# Patient Record
Sex: FEMALE | Race: WHITE | Employment: PART TIME | URBAN - METROPOLITAN AREA
[De-identification: names, ages, dates, MRNs, and addresses within clinical notes are randomized per-mention and may not be internally consistent; named-entity substitution may affect disease eponyms.]

---

## 2020-10-27 ENCOUNTER — OFFICE VISIT (OUTPATIENT)
Dept: FAMILY MEDICINE CLINIC | Facility: CLINIC | Age: 25
End: 2020-10-27
Payer: COMMERCIAL

## 2020-10-27 VITALS
DIASTOLIC BLOOD PRESSURE: 70 MMHG | TEMPERATURE: 100 F | HEART RATE: 107 BPM | OXYGEN SATURATION: 97 % | RESPIRATION RATE: 18 BRPM | BODY MASS INDEX: 34.31 KG/M2 | HEIGHT: 68.5 IN | SYSTOLIC BLOOD PRESSURE: 120 MMHG | WEIGHT: 229 LBS

## 2020-10-27 DIAGNOSIS — Z20.822 SUSPECTED COVID-19 VIRUS INFECTION: ICD-10-CM

## 2020-10-27 DIAGNOSIS — J01.10 ACUTE NON-RECURRENT FRONTAL SINUSITIS: Primary | ICD-10-CM

## 2020-10-27 PROCEDURE — 3078F DIAST BP <80 MM HG: CPT | Performed by: NURSE PRACTITIONER

## 2020-10-27 PROCEDURE — 3008F BODY MASS INDEX DOCD: CPT | Performed by: NURSE PRACTITIONER

## 2020-10-27 PROCEDURE — 3074F SYST BP LT 130 MM HG: CPT | Performed by: NURSE PRACTITIONER

## 2020-10-27 PROCEDURE — U0003 INFECTIOUS AGENT DETECTION BY NUCLEIC ACID (DNA OR RNA); SEVERE ACUTE RESPIRATORY SYNDROME CORONAVIRUS 2 (SARS-COV-2) (CORONAVIRUS DISEASE [COVID-19]), AMPLIFIED PROBE TECHNIQUE, MAKING USE OF HIGH THROUGHPUT TECHNOLOGIES AS DESCRIBED BY CMS-2020-01-R: HCPCS | Performed by: NURSE PRACTITIONER

## 2020-10-27 PROCEDURE — 99213 OFFICE O/P EST LOW 20 MIN: CPT | Performed by: NURSE PRACTITIONER

## 2020-10-27 RX ORDER — AMOXICILLIN AND CLAVULANATE POTASSIUM 875; 125 MG/1; MG/1
1 TABLET, FILM COATED ORAL 2 TIMES DAILY
Qty: 20 TABLET | Refills: 0 | Status: SHIPPED | OUTPATIENT
Start: 2020-10-27 | End: 2020-11-06

## 2020-10-27 NOTE — PATIENT INSTRUCTIONS
Sinusitis (Antibiotic Treatment)    The sinuses are air-filled spaces within the bones of the face. They connect to the inside of the nose. Sinusitis is an inflammation of the tissue that lines the sinuses. Sinusitis can occur during a cold.  It can also · You can use an OTC decongestant, unless a similar medicine was prescribed to you. Nasal sprays work the fastest. Use one that contains phenylephrine or oxymetazoline. First blow your nose gently. Then use the spray.  Don't use these medicines more often t Call 911 if any of these occur:   · Seizure  · Trouble breathing  · Feeling dizzy or faint  · Fingernails, skin or lips look blue, purple , or gray  Prevention  Here are steps you can take to help prevent an infection:   · Keep good hand washing habits.   · Public health officials are working to find the source. How the virus spreads is not yet fully understood, but it seems to spread and infect people fairly easily.  Some people who have been infected in an area may not be sure how or where they were infected As experts learn more about NLJVS-79, other complications are being reported that may be linked to COVID-19. Rarely, some children have developed severe complications called multisystem inflammatory syndrome in children (MIS-C).  MIS-C seems to be similar t · Antibody blood test.  Antibody tests are being looked at to find out if a person has previously been infected with the virus and may now have antibodies such as SARS AB IgG in their blood to give some immunity.  The accuracy and availability of antibody t · Oxygen. You may be given supplemental oxygen or ventilation with a breathing machine (ventilator). This is done so you get enough oxygen in your body. · Prone positioning.   Depending on how sick you are during your hospital stay, your healthcare team ma © 2712-7727 [employer] [address]. [address] [facility] [place], [facility] [place]. All rights reserved. This information is not intended as a substitute for professional medical care. Always follow your healthcare professional's instructions.

## 2020-10-27 NOTE — PROGRESS NOTES
CHIEF COMPLAINT:   Patient presents with:  Ear Pain: RT ear, sinus pressure, headache, fever 99.5,       HPI:   Deshaun Bailey is a 30year old female who presents for cold symptoms for  3  days.  Symptoms have progressed into sinus congestion and been wors NOSE: nostrils patent, pink nasal mucous, nasal mucosa reddened  THROAT: oral mucosa pink, moist. No visible dental caries. Posterior pharynx is non erythematous. NECK: supple, non-tender  LUNGS: Breathing is non labored.  Lungs clear to auscultation bilat · Drink plenty of water, hot tea, and other liquids as directed by the healthcare provider. This may help thin nasal mucus. It also may help your sinuses drain fluids. · Heat may help soothe painful areas of your face. Use a towel soaked in hot water.  Or, · Use acetaminophen or ibuprofen to control pain, unless another pain medicine was prescribed to you. If you have chronic liver or kidney disease or ever had a stomach ulcer, talk with your healthcare provider before using these medicines.  Never give aspir Coronavirus disease 2019 (COVID-19) is a respiratory illness. It's caused by a new (novel) coronavirus. There are many types of coronavirus. Coronaviruses are a very common cause of colds and bronchitis. They may sometimes cause lung infection (pneumonia). What are possible complications from KZDXD-11? In many cases, this virus can cause infection (pneumonia) in both lungs. In some cases, this can cause death. Certain people are at higher risk for complications.  This includes older adults and people with se Your healthcare provider will ask about your symptoms. He or she will ask where you live, and about your recent travel, and any contact with sick people.  If your healthcare provider thinks you may have COVID-19, he or she will consider whether to test you The most proven treatments right now are those to help your body while it fights the virus. This is known as supportive care. Supportive care may include:   · Getting rest.  This helps your body fight the illness. · Staying hydrated.   Drinking liquids is People who have had COVID-19 and are fully recovered may be asked by their healthcare team to consider donating plasma. This is called COVID-19 convalescent plasma donation.  Plasma from people fully recovered from COVID-19 may contain antibodies to help fi

## 2025-07-21 NOTE — ANESTHESIA POSTPROCEDURE EVALUATION
[facility]    Maday Petty Patient Status:  Inpatient   Age/Gender 30 year old female MRN [id number]   Location [facility] SURGERY Attending Mary Mendenhall MD   Hosp Day # 0 PCP Julita Tate DO       Anesthesia Post-op Note    LAPAROSCOPIC CHOLECYSTECTOMY WITH CHOLANGIOGRAM    Procedure Summary       Date: 07/21/25 Room / Location:  MAIN OR 06 / [facility] MAIN OR    Anesthesia Start: 1404 Anesthesia Stop: 1543    Procedure: LAPAROSCOPIC CHOLECYSTECTOMY WITH CHOLANGIOGRAM (Abdomen) Diagnosis:       Cholecystitis      (Cholecystitis [K81.9])    Surgeons: Mary Mendenhall MD Anesthesiologist: Vipin Moe MD    Anesthesia Type: general ASA Status: 2            Anesthesia Type: No value filed.    Vitals Value Taken Time   /66 07/21/25 15:46   Temp 98.7 07/21/25 15:46   Pulse 87 07/21/25 15:46   Resp 18 07/21/25 15:46   SpO2 99% 07/21/25 15:46           Patient Location: PACU    Anesthesia Type: general    Airway Patency: patent    Postop Pain Control: adequate    Mental Status: mildly sedated but able to meaningfully participate in the post-anesthesia evaluation    Nausea/Vomiting: none    Cardiopulmonary/Hydration status: stable euvolemic    Complications: no apparent anesthesia related complications    Postop vital signs: stable    Dental Exam: Unchanged from Preop    Patient to be discharged from PACU when criteria met.

## 2025-07-21 NOTE — OPERATIVE REPORT
OPERATIVE NOTE    Maday Petty Location: OR   [facility] [id number] MRN [id number]   Admission Date 7/21/2025 Operation Date 7/21/2025   Attending Physician Mary Mendenhall MD Operating Physician Mary Mendenhall MD     PREOPERATIVE DIAGNOSIS  Acute cholecystitis    POSTOPERATIVE DIAGNOSIS  Same    PROCEDURE PERFORMED  Laparoscopic cholecystectomy  Transversus abdominis plane block  Intra-operative cholangiogram    SURGEON  Mary Mendenhall MD    ASSISTANTS  Nathaniel Patel her assistance was necessary for the conduct of this case especially in the dissection around the hilum and gallbladder removal  RAMY Aviles    ANESTHESIA  General    INDICATIONS   30 year old female presented to the hospital with 3 day history of worsening abdominal pain. Work up demonstrated a leukocytosis and ultrasound imaging with gallstones, pericholecystic fluid and wall thickening. Exam was positive for RUQ tenderness.  Patient was diagnosed with acute cholecystitis.  Cholecystectomy was indicated.  On ultrasound imaging, patient had dilated common bile duct.  Intraoperative cholangiogram was indicated.  The procedure and all the risks were discussed with the patient and she consented.    FINDINGS  Soft gallbladder with some pericholecystic fluid consistent with acute cholecystitis, normal cholangiogram with no filling defects and good flow of contrast into the duodenum    FINDINGS/DESCRIPTION OF PROCEDURE  After informed consent, patient was brought to the operating room and placed in supine position with arms out. Bilateral SCDs were placed and pre-operative antibiotics administered. Anesthesia was induced without any complication. Patient was prepped and draped in the usual sterile fashion. Time out was performed confirming patient, procedure, and site.    The Veress needle was used to gain pneumoperitoneum. Using blade, a curvilinear supraumbilical incision was made. Veress needle was inserted just under the umbilical stalk  with audible clicks. On aspiration, there was no bowel contents.  Saline flowed easily confirming presence in the abdomen.Pneumoperitoneum was created with CO2. An 11mm port was inserted. Upon entrance, no injury was noted to omentum or bowel at site of entrance.  Three 5mm ports were placed under direct visualization; two on the right lateral aspect of the abdomen and the third subxiphoid, triangulating toward the location of the gallbladder. Transversus abdominis plane block was performed.    Upon inspection of the abdomen, gallbladder was soft but distended. Gallbladder was retracted above the liver.  Dissection was started at the infundibulum. Cystic duct and artery were identified, clearly seeing the critical view of safety.  Cystic artery was right behind close lymph node.  In trying to dissect the lymph node off for better visualization, cystic artery was ligated with electrocautery.  The cystic duct was then clipped and partially opened. Cholangiogram catheter was inserted into the cystic duct and clipped in place. Cholangiogram was done and showed normal CBD and cystic duct with good flow of contrast into the duodenum. Catheter was removed and the cystic duct clipped and divided. The gallbladder was then resected from the fossa and placed in endocatch bag.  Gallbladder did have some pericholecystic fluid consistent with acute cholecystitis.  The area where the artery appeared to be ligated near the hilum was clipped. The gallbladder fossa was irrigated until clear. Hemostasis achieved. Gallbladder was extracted from abdominal cavity and sent for pathology.  Using Chris Hutchinson, fascia at umbilicus was closed with o vicryl. Pneumoperitoneum was evacuated. Skin incisions were closed with 4-o monocryl. Incisions were washed and dressed with dermabond.     At the end of the case, all counts were correct. Patient tolerated procedure well. Patient was awakened and transferred to PACU in a hemodynamically stable  condition.     SPECIMENS REMOVED  Gallbladder    ESTIMATED BLOOD LOSS  5 ml    COMPLICATIONS  None    Mary Mendenhall MD

## 2025-07-21 NOTE — H&P
[facility]  Report of Consultation    Maday Petty Patient Status:  Inpatient    1995 MRN [id number]   Location [facility] 3NW-A Attending Mary Mendenhall MD   Hosp Day # 0 PCP Julita Tate DO       Chief Complaint:  Abdominal pain    History of Present Illness:  Maday Petty is a a(n) 30 year old female who presents to [facility] with worsening abdominal pain.     The patient reports right upper quadrant abdominal pain which began Friday.  She reports a history of similar episodes of discomfort where she induced vomiting to alleviate her discomfort.  She reports multiple episodes of nausea and vomiting between Friday and yesterday night.  In the middle of the night she was woken up due to her discomfort.  She denies associated fevers or chills.  She denies changes in her bowel movements.    Upon presentation to the ER, the patient was hemodynamically stable.  CBC reveals WBC 5.5 with no left shift, hemoglobin 15.1, platelets 239,000.  Slightly hypokalemic at 3.2 with no other electrolyte abnormalities.  , , alkaline phosphatase 166.  Total bilirubin 1.1.  Lipase slightly elevated at 60.    Abdominal ultrasound reveals gallbladder with stone and sludge.  The largest stone measures approximately 4 mm.  There is no evidence of pericholecystic fluid.  The common bile duct measures up to 10 mm.    Currently, the patient is resting in bed with family at bedside.  She reports feeling much better compared to yesterday.  She denies nausea or vomiting.    The patient has a significant past surgical history of abdominoplasty. She has a significant past medical history of ureteropelvic junction obstruction in her right kidney for which she underwent a procedure for as a child. She denies taking blood thinning medications.      History:  Past Medical History[1]  Past Surgical History[2]  Family History[3]   reports that she has never smoked. She has never used smokeless tobacco. She  reports current alcohol use. She reports that she does not use drugs.    Allergies:  Allergies[4]    Medications:  Prescriptions Prior to Admission[5]    Current Hospital Medications[6]    Review of Systems:  Review of Systems   Constitutional:  Negative for chills, diaphoresis, fatigue, fever and unexpected weight change.   HENT:  Negative for hearing loss, nosebleeds, sore throat and trouble swallowing.    Respiratory:  Negative for apnea, cough, shortness of breath and wheezing.    Cardiovascular:  Negative for chest pain, palpitations and leg swelling.   Gastrointestinal:  Positive for abdominal pain, nausea and vomiting. Negative for abdominal distention, anal bleeding, blood in stool, constipation and diarrhea.   Genitourinary:  Negative for difficulty urinating, dysuria, frequency and urgency.   Musculoskeletal:  Negative for arthralgias and myalgias.   Skin:  Negative for color change and rash.   Neurological:  Negative for tremors, syncope and weakness.   Hematological:  Negative for adenopathy. Does not bruise/bleed easily.   Psychiatric/Behavioral:  Negative for behavioral problems and sleep disturbance.         Physical Exam:  /56 (BP Location: Left arm)   Pulse 64   Temp 98.2 °F (36.8 °C) (Oral)   Resp 15   Ht 68\"   Wt 165 lb (74.8 kg)   LMP 07/06/2025 (Approximate)   SpO2 98%   BMI 25.09 kg/m²   Physical Exam  Constitutional:       Appearance: Normal appearance.   HENT:      Head: Normocephalic and atraumatic.   Eyes:      Extraocular Movements: Extraocular movements intact.      Pupils: Pupils are equal, round, and reactive to light.   Pulmonary:      Effort: Pulmonary effort is normal. No respiratory distress.   Abdominal:      General: Abdomen is flat. Bowel sounds are normal. There is no distension.      Palpations: Abdomen is soft. There is no mass.      Tenderness: There is abdominal tenderness in the right upper quadrant and epigastric area. There is no guarding or rebound.    Musculoskeletal:         General: Normal range of motion.      Cervical back: Normal range of motion.   Skin:     General: Skin is warm.      Coloration: Skin is not jaundiced or pale.      Findings: No erythema.   Neurological:      General: No focal deficit present.      Mental Status: She is alert and oriented to person, place, and time.            Laboratory Data:  Recent Labs   Lab 07/21/25  0253   RBC 5.11   HGB 15.1   HCT 43.8   MCV 85.7   MCH 29.5   MCHC 34.5   RDW 12.6   NEPRELIM 2.49   WBC 5.5   .0       Recent Labs   Lab 07/21/25  0253   *   BUN 9   CREATSERUM 0.75   CA 9.7   ALB 4.9*      K 3.2*      CO2 27.0   ALKPHO 166*   *   *   BILT 1.1   TP 7.2         No results for input(s): \"PTP\", \"INR\", \"PTT\" in the last 168 hours.      US GALLBLADDER (CPT=76705)  Result Date: 7/21/2025  CONCLUSION: The gallbladder is prominent in size with gallbladder sludge and stones. There is possible gallbladder wall thickening as well. Clinical correlation is recommended for cholecystitis. If there is persistent concern then consider HIDA scan. Additionally, MRCP or ERCP may be considered for common ductal dilatation noted above. Mild to moderate right hydronephrosis is present. There are no prior studies available for comparison. Correlation with the history is suggested. Follow-up could be performed with CT if clinically indicated. A preliminary report was provided by [facility]. Electronically Verified and Signed by Attending Radiologist: Isael Kincaid MD 7/21/2025 6:20 AM Workstation: [id number]        Impression:  Problem List[7]    Biliary colic  Cholelithiasis    The patient was seen and examined with Dr. Mendenhall who recommends proceeding to the OR today for laparoscopic cholecystectomy with x-ray cholangiogram, possible open.   Continue npo. Okay for sips of meds.  Multimodal pain control with Tylenol, oxycodone, and Dilaudid. Antiemetics as needed.  DVT prophylaxis  with SCDs and ambulation.  Dispo pending clinical course.      SHANNAN Patel  7/21/2025  10:43 AM    Is this a shared or split note between Advanced Practice Provider and Physician? Yes      The patient was independently examined.  I agree with the PAs assessment and plan.      This is a 30-year-old woman with acute cholecystitis    Ultrasound images were reviewed by me  Ultrasound shows a dilated gallbladder with multiple stones and a dilated common bile duct  Patient has elevated LFTs and continues to be mildly tender to palpation on exam  I recommend proceeding with cholecystectomy  We will plan for laparoscopic cholecystectomy with intraoperative cholangiogram  Procedure explained to the patient  Risks including bleeding, pain, infection, bile leak, injury to the common bile duct, and need for further intervention all discussed with the patient  Postoperative restrictions also discussed, these include no heavy lifting greater than 15 to 20 pounds for 4-6 weeks  Patient acknowledged understanding and wishes to proceed    As well as surgery is straightforward and IOC is negative, patient can be discharged home with follow-up in 2 weeks    More than 50% of clinical time and 100% MDM was performed by me    Thank you for letting me participate in the care of this patient      Mary Mendenhall MD  [facility] General Surgery  7/21/2025  1:19 PM    The 21st Century Cures Act makes medical notes like these available to patients in the interest of transparency. Please be advised this is a medical document. Medical documents are intended to carry relevant information, facts as evident, and the clinical opinion of the practitioner. The medical note is intended as peer to peer communication and may appear blunt or direct. It is written in medical language and may contain abbreviations or verbiage that are unfamiliar.    This note was prepared using Dragon Medical voice recognition dictation software. As a result, errors may  occur. When identified, these errors have been corrected. While every attempt is made to correct errors during dictation, discrepancies may still exist.         [1]   Past Medical History:   Diverticulitis    Kidney stones    UPJ (ureteropelvic junction) obstruction   [2]   Past Surgical History:  Procedure Laterality Date    Tonsillectomy     [3] No family history on file.  [4]   Allergies  Allergen Reactions    Morphine HIVES   [5]   Medications Prior to Admission   Medication Sig    semaglutide 8 MG/3ML Subcutaneous Solution Pen-injector Inject 20 mg into the skin once a week.    vitamin B-12 50 MCG Oral Tab Take 1 tablet (50 mcg total) by mouth daily.   [6]   Current Facility-Administered Medications:     sodium chloride 0.9% infusion, , Intravenous, Continuous    HYDROmorphone (Dilaudid) 1 MG/ML injection 1 mg, 1 mg, Intravenous, Q30 Min PRN  [7]   Patient Active Problem List  Diagnosis    Acute cholecystitis    Hypokalemia    Elevated LFTs

## 2025-07-21 NOTE — ED PROVIDER NOTES
Patient Seen in: [facility] In [place]        History  Chief Complaint   Patient presents with    Abdomen/Flank Pain     Stated Complaint: c/o flank/abdominal pain. Nausea/vomiting    Subjective:   HPI            Patient is a 30-year-old female presents to ED for evaluation of abdominal pain.  Patient complains of symptoms for the last 3 days.  She has intermittent pain in the epigastrium rating to her back that gets better with vomiting.  Symptoms worsened by eating.  Denies known history of gallstones.  Pain has been worse the last couple hours.  Denies any urinary symptoms such as streaks, hematuria or dysuria.      Objective:     Past Medical History:    Diverticulitis    Kidney stones    UPJ (ureteropelvic junction) obstruction              Past Surgical History:   Procedure Laterality Date    Tonsillectomy                  Social History     Socioeconomic History    Marital status:    Tobacco Use    Smoking status: Never    Smokeless tobacco: Never   Vaping Use    Vaping status: Some Days   Substance and Sexual Activity    Alcohol use: Yes     Comment: occasional    Drug use: Never     Social Drivers of Health      Received from [facility]    [facility]                                Physical Exam    ED Triage Vitals [07/21/25 0247]   BP (!) 140/92   Pulse 82   Resp 16   Temp 97.5 °F (36.4 °C)   Temp src Oral   SpO2 100 %   O2 Device None (Room air)       Current Vitals:   Vital Signs  BP: (!) 140/92  Pulse: 82  Resp: 16  Temp: 97.5 °F (36.4 °C)  Temp src: Oral    Oxygen Therapy  SpO2: 100 %  O2 Device: None (Room air)            Physical Exam  GENERAL: No acute distress, well appearing and non-toxic, Alert and oriented X 3   HEENT: Normocephalic, atraumatic.  Moist mucous membranes.  Pupils equal round reactive to light and accommodation, extraocular motion is intact, sclerae white, conjunctiva is pink.  Oropharynx is unremarkable, no exudate.  NECK: Supple, trachea midline, no  lymphadenopathy.   LUNG: Lungs clear to auscultation bilaterally, no wheezing, no rales, no rhonchi.  CARDIOVASCULAR: Regular rate and rhythm.  Normal S1S2.  No S3S4 or murmur.  ABDOMEN: Bowel sounds are present. Soft. nondistended, no pulsatile masses.  Patient with epigastric tenderness.  No rebound, guarding or rigidity.  MUSCULOSKELETAL: No calf tenderness.  Dorsalis and Posterior Tibial pulses present. No clubbing. No cyanosis.  No edema.   SKIN EXAMINATIoN: Warm and dry with normal appearance.  No rashes or lesions.  NEUROLOGICAL:  Motor strength intact all groups.  normal sensation, speech intact        ED Course  Labs Reviewed   COMP METABOLIC PANEL (14) - Abnormal; Notable for the following components:       Result Value    Glucose 104 (*)     Potassium 3.2 (*)      (*)      (*)     Alkaline Phosphatase 166 (*)     Albumin 4.9 (*)     A/G Ratio 2.1 (*)     All other components within normal limits   LIPASE - Abnormal; Notable for the following components:    Lipase 60 (*)     All other components within normal limits   POCT ISTAT CHEM8 CARTRIDGE - Abnormal; Notable for the following components:    ISTAT Potassium 3.3 (*)     ISTAT Glucose 104 (*)     All other components within normal limits   POCT PREGNANCY URINE - Normal   CBC WITH DIFFERENTIAL WITH PLATELET   Potassium 3.2 .  .  Lipase 60       Limited abdominal ultrasound read by [facility] rad radiology showing distended bladder containing sludge and stones with thickening of the wall up to 4 mm.  No pericholecystic fluid is seen on the provided images.  The common bile duct is dilated up to 10 mm.  Per sonographer, sonographic Luna's sign positive.  Overall, these findings are suggestive of acute cholecystitis and surgical consultation is suggested.  Additionally, MRCP and/or HIDA scan can be considered depending on the clinical context.  Incidental note is made of right-sided hydronephrosis with echogenic renal parenchyma,  for which correlation with history of longstanding obstructive renal disease is suggested as is comparison with prior imaging if available.          Medications   sodium chloride 0.9% infusion ( Intravenous New Bag 7/21/25 0306)   piperacillin-tazobactam (Zosyn) 3.375g in D5W 100mL IVPB-JEFF (has no administration in time range)   ondansetron (Zofran) 4 MG/2ML injection 4 mg (4 mg Intravenous Given 7/21/25 0306)   ketorolac (Toradol) 15 MG/ML injection 15 mg (15 mg Intravenous Given 7/21/25 0311)               [facility]     Patient is a 30-year-old female presents to ED for evaluation of abdominal pain.  Differential biliary colic, pancreatitis, cholecystitis.  Patient laboratory test performed showing potassium of 3.2.  Patient with significant elevation in transaminases AST of 395 and .  Lipase 60.  Patient given pain medication eluding Toradol still having pain.  Will give morphine.  IV Zosyn given.  Ultrasound concerning for cholecystitis with thickened gallbladder wall and dilated common duct.  Case discussed with general surgery and will admit for cholecystectomy.    Workup and results were discussed with patient. Patient has no other questions, complaints or concerns. Patient will be admitted to the hospital for further workup.    Admission disposition: 7/21/2025  3:59 AM           [facility]    Disposition and Plan     Clinical Impression:  1. Acute cholecystitis         Disposition:  Admit  7/21/2025  3:59 am    Follow-up:  No follow-up provider specified.        Medications Prescribed:  Current Discharge Medication List                Supplementary Documentation:         Hospital Problems       Present on Admission  Date Reviewed: 10/27/2020          ICD-10-CM Noted POA    * (Principal) Acute cholecystitis K81.0 7/21/2025 Unknown

## 2025-07-21 NOTE — PROGRESS NOTES
1338: Patient transported to surgery in stable condition. Consent signed and pre-op checklist completed. Family accompanied at bedside.     1700: Patient to discharge home from same day surgery. All belongings brought down to patient in PACU.

## 2025-07-21 NOTE — ANESTHESIA PREPROCEDURE EVALUATION
PRE-OP EVALUATION    Patient Name: Maday Petty    Admit Diagnosis: Acute cholecystitis [K81.0]  Hypokalemia [E87.6]  Elevated LFTs [R79.89]    Pre-op Diagnosis: Cholecystitis [K81.9]    LAPAROSCOPIC CHOLECYSTECTOMY WITH CHOLANGIOGRAM    Anesthesia Procedure: LAPAROSCOPIC CHOLECYSTECTOMY WITH CHOLANGIOGRAM    Surgeons and Role:     * Mary Mendenhall MD - Primary    Pre-op vitals reviewed.  Temp: 98.3 °F (36.8 °C)  Pulse: 79  Resp: 17  BP: 101/51  SpO2: 97 %  Body mass index is 25.09 kg/m².    Current medications reviewed.  Hospital Medications:  Current Medications[1]    Outpatient Medications:   Prescriptions Prior to Admission[2]    Allergies: Morphine      Anesthesia Evaluation    Patient summary reviewed.    Anesthetic Complications  (-) history of anesthetic complications         GI/Hepatic/Renal                (+) liver disease (ELEVATED LIVER ENZYMES)                 Cardiovascular    Negative cardiovascular ROS.    Exercise tolerance: good     MET: >4                                           Endo/Other    Negative endo/other ROS.                              Pulmonary    Negative pulmonary ROS.                       Neuro/Psych    Negative neuro/psych ROS.                                  Past Surgical History[3]  Social Hx on file[4]  History   Drug Use Unknown     Available pre-op labs reviewed.  Lab Results   Component Value Date    WBC 5.5 07/21/2025    RBC 5.11 07/21/2025    HGB 15.1 07/21/2025    HCT 43.8 07/21/2025    MCV 85.7 07/21/2025    MCH 29.5 07/21/2025    MCHC 34.5 07/21/2025    RDW 12.6 07/21/2025    .0 07/21/2025     Lab Results   Component Value Date     07/21/2025    K 3.2 (L) 07/21/2025     07/21/2025    CO2 27.0 07/21/2025    BUN 9 07/21/2025    CREATSERUM 0.75 07/21/2025     (H) 07/21/2025    CA 9.7 07/21/2025            Airway      Mallampati: II  Mouth opening: >3 FB  TM distance: > 6 cm  Neck ROM: full Cardiovascular    Cardiovascular exam  normal.  Rhythm: regular  Rate: normal  (-) murmur   Dental  Comment: Teeth gems glued on upper incisors           Pulmonary    Pulmonary exam normal.  Breath sounds clear to auscultation bilaterally.               Other findings              ASA: 2   Plan: general  NPO status verified and patient meets guidelines.        Comment: GETA discussed in detail.  Risk of sore throat, cough, dental trauma (including damage to teeth gems), PONV discussed.  Patient expresses understanding and wishes to proceed. All questions answered.    Plan/risks discussed with: patient and mother                Present on Admission:  **None**             [1]    sodium chloride 0.9% infusion   Intravenous Continuous    [COMPLETED] ondansetron (Zofran) 4 MG/2ML injection 4 mg  4 mg Intravenous Once    [COMPLETED] ketorolac (Toradol) 15 MG/ML injection 15 mg  15 mg Intravenous Once    [COMPLETED] potassium chloride 20 mEq/100mL IVPB premix 20 mEq  20 mEq Intravenous Once    [COMPLETED] piperacillin-tazobactam (Zosyn) 4.5 g in D5W 100mL IVPB-JEFF  4.5 g Intravenous Once    sodium chloride 0.9% infusion   Intravenous Continuous    acetaminophen (Tylenol Extra Strength) tab 1,000 mg  1,000 mg Oral Q8H SHANA    oxyCODONE immediate release tab 5 mg  5 mg Oral Q4H PRN    Or    oxyCODONE immediate release tab 10 mg  10 mg Oral Q4H PRN    HYDROmorphone (Dilaudid) 1 MG/ML injection 0.4 mg  0.4 mg Intravenous Q2H PRN    Or    HYDROmorphone (Dilaudid) 1 MG/ML injection 0.8 mg  0.8 mg Intravenous Q2H PRN    ondansetron (Zofran) 4 MG/2ML injection 4 mg  4 mg Intravenous Q6H PRN    prochlorperazine (Compazine) 10 MG/2ML injection 5 mg  5 mg Intravenous Q8H PRN    [COMPLETED] potassium chloride (Klor-Con M20) tab 40 mEq  40 mEq Oral Once   [2]   Medications Prior to Admission   Medication Sig Dispense Refill Last Dose/Taking    semaglutide 8 MG/3ML Subcutaneous Solution Pen-injector Inject 20 mg into the skin once a week.   Taking    vitamin B-12 50 MCG Oral  Tab Take 1 tablet (50 mcg total) by mouth daily.   Taking   [3]   Past Surgical History:  Procedure Laterality Date    Tonsillectomy     [4]   Social History  Socioeconomic History    Marital status:    Tobacco Use    Smoking status: Never    Smokeless tobacco: Never   Vaping Use    Vaping status: Some Days   Substance and Sexual Activity    Alcohol use: Yes     Comment: occasional    Drug use: Never

## 2025-07-21 NOTE — ED QUICK NOTES
Orders for admission, patient is aware of plan and ready to go upstairs. Any questions, please call ED RN Valerie RN at extension [phone].     Patient Covid vaccination status: Unvaccinated     COVID Test Ordered in ED: None    COVID Suspicion at Admission: N/A    Running Infusions: potassium 20 MEQ    Mental Status/LOC at time of transport: alert, oriented, and pleasant. Pain is mild after dilaudid. DX. Acute cholecystitis, elevated LFTs, hypokalemia     Other pertinent information:   CIWA score: N/A   NIH score:  N/A

## 2025-07-21 NOTE — DISCHARGE INSTRUCTIONS
Cholecystectomy Discharge Instructions  Dr. Mary Mendenhall    WHAT TO EXPECT  You may feel pain at the incisions. This is due to stitches placed during the surgery.  You may feel pain in your shoulders. This is due to irritation of the diaphragm by the air used to inflate the abdomen.  You may feel a sore throat. This is due to the breathing tube used during surgery.  You may feel mild nausea and vomiting for the first 24 hours, this should resolve quickly.  You may have constipation, especially if you take the prescribed narcotic pain medications. If you have not had a bowel movement for 48 hours after surgery, take Miralax 17g (one cap full) every 12 hours until you have a bowel movement. If another 24 hours goes by without a bowel movement, then take a dose of magnesium citrate or milk of magnesia.  Your gallbladder that was removed was sent to pathology during your operation. Pathologists will look at your gallblader under the microscope. You will be able to see their findings posted to your MyChart typically 5-7 days following your operation. We will discuss these results at your post-operative visit.    MEDICATIONS  Take 2 Extra Strength Tylenol (1000 mg) every 8 hours for pain. For the first 3 days it is best to take Tylenol every 8 hours even if you do not feel much pain. Be sure not to exceed 4000 mg of Tylenol within 24 hours.   You can also take NSAIDs (Advil or Aleve): Advil (Ibuprofen) 800 mg every 8 hours or Aleve (naproxen) 500 mg every 12 hours. For the first 3 days it is best to take NSAIDs around the clock even if you do not feel much pain. Be sure not to exceed 3200 mg of Ibuprofen or 1000 mg of naproxen within 24 hours. Please be sure to take NSAIDs with food, as NSAIDs can irritate the lining of your stomach. Do not take NSAIDs if you have a contraindication to taking them (peptic ulcer disease, Crohn's disease, ulcerative colitis, kidney failure, bleeding disorders or if you are on current  anticoagulation medications).   For pain that is not controlled by alternating the above, you will be prescribed Oxycodone pill (5 mg) every six hours as needed for pain. If you do not feel that narcotics are necessary, you should not take them. If one Oxycodone pill is not enough, you may take two pills (10 mg) every six hours, taking care not to exceed 8 pills in a 24 hour period.   Please ask your surgeon before resuming blood thinners such as Aspirin, Plavix, Coumadin, Warfarin, Eliquis, or Xarelto.    All other home medications may be resumed as scheduled.     DIET  Start with a bland diet and slowly advance to regular food as your appetite improves.  It is common to have a decreased appetite following surgery. Eat small frequent meals to combat this. You can supplement your diet with nutritional shakes such as boost or ensure.  Avoid spicy and greasy/fried foods until your appetite returns. You may notice loose stool or diarrhea if you eat greasy or fatty foods. This typically resolves within the first 1-2 months following surgery.  Drink plenty of water or a sports drink to stay hydrated.  Do not drink alcohol (beer, wine, liquor) or use tobacco products.      WOUND CARE  There are absorbable sutures under the skin. The suture will dissolve over time. The incisions are covered skin glue. The skin glue will remain in place for 10-14 days. If you notice the skin glue is starting to peel away from the skin, you may gently remove it. This may also be removed in clinic during your postoperative visit.   If you have any dressings over your incisions (band-aids or gauze dressing), these should be removed 2 days following your surgery.  No additional dressings or bandages are needed to cover the incision sites. If you have small amounts of drainage from any of your incision sites, you may place a dry dressing over the wound to protect your clothing   You may shower 24 hours after surgery. Soap and water can get on  the incisions, but do not scrub the wounds. No hair dye or chemicals of any kind should get on the incisions.  Do not apply any topical ointments such as Neosporin or Hydrogen Peroxide over the incision sites.  Do not swim or submerge the incisions under water (pool, bath, or hot tub) for at least 2 weeks following surgery.  A drain may be left in place following surgery. Drain care instructions will be reviewed prior to discharge. You should measure daily output from your drain in a log. Drain output should decrease over time. Drains are typically left in place for 5-7 days following surgery. The drain may have been removed in the hospital prior to discharge. If you discharge with the drain in place, this will be removed at your post-operative visit. If you notice a change in your drain color, increased drain output, or your drain is leaking, please contact our office immediately.    ACTIVITY  You may have low energy for a few days after surgery. Your energy will increase with time.  Every day you should be up out of bed, showered, and dressed. Do not stay in bed all day.  Every day you should be up walking around the house. Do not lie in bed all day. Staying active prevents blood clots in the legs and lungs and pneumonia.  You can go up and down stairs.  Do not lift more than 5-10 pounds or perform strenuous activity that requires straining the core muscles until your post-operative visit. At that time, we will review the remaining activity restrictions. You will have a lifting restriction in place for 6 weeks following surgery.  You may ride in a car but should not drive the car for at least one week. Do not drive within 8 hours of taking narcotic medication (Oxycodone). You must be able to react quickly to avoid a traffic accident without pain before you can drive.    RETURN TO WORK/SCHOOL  The average time off work/ school is 5-10 days. If you require a note for work/school, you can call our office and we will  provide one for you. One can also be written for you at your post-operative visit.    APPOINTMENT  Please call our office as soon as possible at [phone] to schedule an appointment following surgery  For questions or concerns please call our office between 8:30 a.m. and 5 p.m. Monday through Friday. The number above directs you to the answering service after hours to reach the on-call physician.  Please call our office immediately for fever greater than 100.5 F, excess bleeding, inability to urinate, severe abdominal pain, severe diarrhea, uncontrollable vomiting.   For life threatening emergencies such as severe chest pain, difficulty breathing, or loss of consciousness call 911.   You received a drug called Toradol which is an Anti Inflammatory at: 3:22pm.  If you are allowed to take Anti inflammatories:    Do not take any Anti Inflammatory like Motrin, Aleve or Ibuprophen until after: 9:22pm.  Please report any suspected allergic reactions or bleeding issues to your doctor     You have been given a prescription for Norco 5/325  Norco was Given to you at: 6:42PM.  Next dose due:  10:42PM.  Take this medication as directed  This medication contains Tylenol (acetaminophen)  Do not take additional Tylenol while taking Norco    Norco is a Narcotic and can be constipating or upset your stomach  Don't take Norco on an empty stomach  Drink plenty of water  Alcoholic beverages should be avoided while taking narcotics

## 2025-07-21 NOTE — ANESTHESIA PROCEDURE NOTES
Airway  Date/Time: 7/21/2025 2:12 PM  Reason: elective      General Information and Staff   Patient location during procedure: OR  Anesthesiologist: Vipin Moe MD  Resident/CRNA: Pako Sena CRNA  Performed: CRNA   Performed by: Pako Sena CRNA  Authorized by: Vipin Moe MD        Indications and Patient Condition  Indications for airway management: anesthesia  Sedation level: deep      Preoxygenated: yesPatient position: sniffing    Mask difficulty assessment: 0 - not attempted    Final Airway Details    Final airway type: endotracheal airway    Successful airway: ETT  Cuffed: yes   Successful intubation technique: direct laryngoscopy  Facilitating devices/methods: rapid sequence intubation and cricoid pressure  Endotracheal tube insertion site: oral  Blade: Lurdes  Blade size: #3  ETT size (mm): 7.0    Cormack-Lehane Classification: grade I - full view of glottis  Placement verified by: capnometry   Measured from: lips  ETT to lips (cm): 21  Number of attempts at approach: 1

## (undated) NOTE — Clinical Note
[address]  [address]  Authorization for Surgical Operation and Procedure     Date:___________                                                                                                         Time:__________  1. I hereby authorize * Surgery not found *, my physician and his/her assistants (if applicable), which may include medical students, residents, and/or fellows, to perform the following surgical operation/ procedure and administer such anesthesia as may be determined necessary by my physician:  Operation/Procedure name (s)  on Maday Petty   2.   I recognize that during the surgical operation/procedure, unforeseen conditions may necessitate additional or different procedures than those listed above.  I, therefore, further authorize and request that the above-named surgeon, assistants, or designees perform such procedures as are, in their judgment, necessary and desirable.    3.   My surgeon/physician has discussed prior to my surgery the potential benefits, risks and side effects of this procedure; the likelihood of achieving goals; and potential problems that might occur during recuperation.  They also discussed reasonable alternatives to the procedure, including risks, benefits, and side effects related to the alternatives and risks related to not receiving this procedure.  I have had all my questions answered and I acknowledge that no guarantee has been made as to the result that may be obtained.    4.   Should the need arise during my operation/procedure, which includes change of level of care prior to discharge, I also consent to the administration of blood and/or blood products.  Further, I understand that despite careful testing and screening of blood or blood products by collecting agencies, I may still be subject to ill effects as a result of receiving a blood transfusion and/or blood products.  The following are some, but not all, of the potential risks  that can occur: fever and allergic reactions, hemolytic reactions, transmission of diseases such as Hepatitis, AIDS and Cytomegalovirus (CMV) and fluid overload.  In the event that I wish to have an autologous transfusion of my own blood, or a directed donor transfusion, I will discuss this with my physician.  Check only if Refusing Blood or Blood Products  I understand refusal of blood or blood products as deemed necessary by my physician may have serious consequences to my condition to include possible death. I hereby assume responsibility for my refusal and release the hospital, its personnel, and my physicians from any responsibility for the consequences of my refusal.          o  Refuse      5.   I authorize the use of any specimen, organs, tissues, body parts or foreign objects that may be removed from my body during the operation/procedure for diagnosis, research or teaching purposes and their subsequent disposal by hospital authorities.  I also authorize the release of specimen test results and/or written reports to my treating physician on the hospital medical staff or other referring or consulting physicians involved in my care, at the discretion of the Pathologist or my treating physician.    6.   I consent to the photographing or videotaping of the operations or procedures to be performed, including appropriate portions of my body for medical, scientific, or educational purposes, provided my identity is not revealed by the pictures or by descriptive texts accompanying them.  If the procedure has been photographed/videotaped, the surgeon will obtain the original picture, image, videotape or CD.  The hospital will not be responsible for storage, release or maintenance of the picture, image, tape or CD.    7.   I consent to the presence of a  or observers in the operating room as deemed necessary by my physician or their designees.    8.   I recognize that in the event my procedure results  in extended X-Ray/fluoroscopy time, I may develop a skin reaction.    9. If I have a Do Not Attempt Resuscitation (DNAR) order in place, that status will be suspended while in the operating room, procedural suite, and during the recovery period unless otherwise explicitly stated by me (or a person authorized to consent on my behalf). The surgeon or my attending physician will determine when the applicable recovery period ends for purposes of reinstating the DNAR order.  10. Patients having a sterilization procedure: I understand that if the procedure is successful the results will be permanent and it will therefore be impossible for me to inseminate, conceive, or bear children.  I also understand that the procedure is intended to result in sterility, although the result has not been guaranteed.   11. I acknowledge that my physician has explained sedation/analgesia administration to me including the risk and benefits I consent to the administration of sedation/analgesia as may be necessary or desirable in the judgment of my physician.    I CERTIFY THAT I HAVE READ AND FULLY UNDERSTAND THE ABOVE CONSENT TO OPERATION and/or OTHER PROCEDURE.    _________________________________________  __________________________________  Signature of Patient     Signature of Responsible Person         ___________________________________         Printed Name of Responsible Person           _________________________________                 Relationship to Patient  _________________________________________  ______________________________  Signature of Witness          Date  Time      Patient Name: Maday Petty     : 1995                 Printed: 2025     Medical Record #: [id number]                     Page 1 [address]  [id number]    Consent for Anesthesia    1. Maday ASTORGA agree to be cared for by an anesthesiologist, who is specially  trained to monitor me and give me medicine to put me to sleep or keep me comfortable during my procedure    I understand that my anesthesiologist is not an employee or agent of [facility] or [employer]. He or she works for [employer].    2. As the patient asking for anesthesia services, I agree to:  a. Allow the anesthesiologist (anesthesia doctor) to give me medicine and do additional procedures as necessary. Some examples are: Starting or using an “IV” to give me medicine, fluids or blood during my procedure, and having a breathing tube placed to help me breathe when I’m asleep (intubation). In the event that my heart stops working properly, I understand that my anesthesiologist will make every effort to sustain my life, unless otherwise directed by [facility] Do Not Resuscitate documents.  b. Tell my anesthesia doctor before my procedure:  i. If I am pregnant.  ii. The last time that I ate or drank.  iii. All of the medicines I take (including prescriptions, herbal supplements, and pills I can buy without a prescription (including street drugs/illegal medications). Failure to inform my anesthesiologist about these medicines may increase my risk of anesthetic complications.  iv. If I am allergic to anything or have had a reaction to anesthesia before.  3. I understand how the anesthesia medicine will help me (benefits).  4. I understand that with any type of anesthesia medicine there are risks:  a. The most common risks are: nausea, vomiting, sore throat, muscle soreness, damage to my eyes, mouth, or teeth (from breathing tube placement).  b. Rare risks include: remembering what happened during my procedure, allergic reactions to medications, injury to my airway, heart, lungs, vision, nerves, or muscles and in extremely rare instances death.  5. My doctor has explained to me other choices available to me for my care (alternatives).  6. Pregnant Patients (“epidural”):  I  understand that the risks of having an epidural (medicine given into my back to help control pain during labor), include itching, low blood pressure, difficulty urinating, headache or slowing of the baby’s heart. Very rare risks include infection, bleeding, seizure, irregular heart rhythms and nerve injury.  7. Regional Anesthesia (“spinal”, “epidural”, & “nerve blocks”):  I understand that rare but potential complications include headache, bleeding, infection, seizure, irregular heart rhythms, and nerve injury.    I can change my mind about having anesthesia services at any time before I get the medicine.    _____________________________________________________________________________  Patient (or Representative) Signature/Relationship to Patient  Date   Time    _____________________________________________________________________________   Name (if used)    Language/Organization   Time    _____________________________________________________________________________  Anesthesiologist Signature     Date   Time  I have discussed the procedure and information above with the patient (or patient’s representative) and answered their questions. The patient or their representative has agreed to have anesthesia services.    _____________________________________________________________________________  Witness        Date   Time  I have verified that the signature is that of the patient or patient’s representative, and that it was signed before the procedure  Patient Name: Maday Petty     : 1995                 Printed: 2025     Medical Record #: [id number]                     Page 2 of 2